# Patient Record
Sex: FEMALE | Race: WHITE | Employment: FULL TIME | URBAN - METROPOLITAN AREA
[De-identification: names, ages, dates, MRNs, and addresses within clinical notes are randomized per-mention and may not be internally consistent; named-entity substitution may affect disease eponyms.]

---

## 2017-04-25 ENCOUNTER — TELEPHONE (OUTPATIENT)
Dept: NEUROLOGY | Facility: CLINIC | Age: 37
End: 2017-04-25

## 2017-04-26 ENCOUNTER — OFFICE VISIT (OUTPATIENT)
Dept: NEUROLOGY | Facility: CLINIC | Age: 37
End: 2017-04-26

## 2017-04-26 ENCOUNTER — HOSPITAL ENCOUNTER (OUTPATIENT)
Dept: GENERAL RADIOLOGY | Facility: HOSPITAL | Age: 37
Discharge: HOME | End: 2017-04-26
Attending: PHYSICAL MEDICINE & REHABILITATION
Payer: COMMERCIAL

## 2017-04-26 VITALS
DIASTOLIC BLOOD PRESSURE: 66 MMHG | HEIGHT: 60 IN | OXYGEN SATURATION: 96 % | WEIGHT: 95 LBS | RESPIRATION RATE: 15 BRPM | SYSTOLIC BLOOD PRESSURE: 112 MMHG | HEART RATE: 89 BPM | BODY MASS INDEX: 18.65 KG/M2

## 2017-04-26 DIAGNOSIS — M95.5 PELVIC OBLIQUITY: ICD-10-CM

## 2017-04-26 DIAGNOSIS — M46.1 SACROILIITIS (HCC): ICD-10-CM

## 2017-04-26 DIAGNOSIS — M51.26 BULGING LUMBAR DISC: ICD-10-CM

## 2017-04-26 DIAGNOSIS — M70.71 ISCHIAL BURSITIS OF RIGHT SIDE: ICD-10-CM

## 2017-04-26 DIAGNOSIS — M70.71 ISCHIAL BURSITIS OF RIGHT SIDE: Primary | ICD-10-CM

## 2017-04-26 PROCEDURE — 99204 OFFICE O/P NEW MOD 45 MIN: CPT | Performed by: PHYSICAL MEDICINE & REHABILITATION

## 2017-04-26 PROCEDURE — 72202 X-RAY EXAM SI JOINTS 3/> VWS: CPT

## 2017-04-26 PROCEDURE — 72120 X-RAY BEND ONLY L-S SPINE: CPT

## 2017-04-26 RX ORDER — MULTIVITAMIN
1 TABLET ORAL DAILY
COMMUNITY

## 2017-04-26 RX ORDER — NORGESTREL AND ETHINYL ESTRADIOL 0.3-0.03MG
KIT ORAL DAILY
Refills: 6 | COMMUNITY
Start: 2017-03-30

## 2017-04-26 RX ORDER — IBUPROFEN 200 MG
200 TABLET ORAL EVERY 6 HOURS PRN
COMMUNITY
End: 2018-11-05 | Stop reason: SINTOL

## 2017-04-26 NOTE — PATIENT INSTRUCTIONS
- schedule physical therapy [phone]  Hugo Mejía  - get xrays for your pelvis and low back  - follow up in 3rd or 4th week of therapy  - ibuprofen 600mg three times a day for one week  - check waist height in mirror    As of October 6th 2014, the Drug [facility] be picking up prescription, office must be given name of individual in advance and they must present an ID as well. · The name of the person picking up your prescription must be documented in your chart.

## 2017-04-26 NOTE — PROGRESS NOTES
History of Present Illness:   Patient presents with:  Pain: new right handed patient here with right bottom of buttocks pain and pinching with tightness in low back/tailbone area which started in 2011 after running.  patient would like to run again and pain 200 mg by mouth every 6 (six) hours as needed for Pain. Disp:  Rfl:    Menthol, Topical Analgesic, (BIOFREEZE) 4 % External Gel Apply topically as needed.  Disp:  Rfl:        Family History   Problem Relation Age of Onset   • Heart Disease Father    • Brice Yany eyes, nares, ears. Lymphatic:  No femoral nodes or masses lisa. Lungs: no acute respiratory distress. Abdomen: soft, nontender  Pelvic alignment: right higher asis and psis in standing  Extremities:  No lower extremity edema lisa.   Without clubbing or cy for sacroiliitis  5. She has L5-S1 bulging disc seen on mri pelvis in past. Discussed with pelvic obliquity, right L5 and S1 can be intermittently pinched depending on how she is running.    6. Recommended she stay at current mileage for now until she start

## 2017-04-28 ENCOUNTER — TELEPHONE (OUTPATIENT)
Dept: NEUROLOGY | Facility: CLINIC | Age: 37
End: 2017-04-28

## 2017-04-29 NOTE — TELEPHONE ENCOUNTER
Results of Sacroiliac Joint and Lumbar Spine Flex/Ext Xrays done 4/26/17  No injection documented as being recommended as of yet-will review with Dr. Delle Aschoff

## 2017-05-07 PROBLEM — M70.71 ISCHIAL BURSITIS OF RIGHT SIDE: Status: ACTIVE | Noted: 2017-05-07

## 2017-05-07 PROBLEM — M95.5 PELVIC OBLIQUITY: Status: ACTIVE | Noted: 2017-05-07

## 2017-05-08 NOTE — TELEPHONE ENCOUNTER
pls call - sacroiliac joint xrays normal. Low back xrays show the bottom disc is thinned out. She has a little more curve in the low back.  See if she can go to [facility] pt. -mk

## 2017-05-08 NOTE — TELEPHONE ENCOUNTER
Patient was given Dr. Isidro Hayes message (as written on 5/07/17) regarding her x-rays, and going to [facility] physical therapy. Patient said she will schedule to therapy; said she has the order.

## 2017-05-24 ENCOUNTER — OFFICE VISIT (OUTPATIENT)
Dept: PHYSICAL THERAPY | Facility: HOSPITAL | Age: 37
End: 2017-05-24
Payer: COMMERCIAL

## 2017-05-24 DIAGNOSIS — M46.1 SACROILIITIS (HCC): ICD-10-CM

## 2017-05-24 DIAGNOSIS — M51.26 BULGING LUMBAR DISC: Primary | ICD-10-CM

## 2017-05-24 DIAGNOSIS — M95.5 PELVIC OBLIQUITY: ICD-10-CM

## 2017-05-24 DIAGNOSIS — M70.71 ISCHIAL BURSITIS OF RIGHT SIDE: ICD-10-CM

## 2017-05-24 PROCEDURE — 97110 THERAPEUTIC EXERCISES: CPT

## 2017-05-24 PROCEDURE — 97161 PT EVAL LOW COMPLEX 20 MIN: CPT

## 2017-05-24 PROCEDURE — 97535 SELF CARE MNGMENT TRAINING: CPT

## 2017-05-24 NOTE — PROGRESS NOTES
PELVIC FLOOR EVALUATION:   Referring Physician: Dr. NEELY [facility]  Diagnosis: Bulging lumbar disc (M51.26)  Sacroiliitis ([address] (M46.1)  Pelvic obliquity (M95.5)  Ischial bursitis of right side (M70.71)  Date of Onset: 02/21/2017     Date of Service: 5/24/2017 mile runs which requires prophylactic ibuprofen prior to the run. Occasional urinary incontinence with faster pace running, intermittent ABDULLAHI with a strong sneeze and cough. Denies any difficulty with bowel movements.  Quadraped position causes deep vaginal pelvic floor strength to at least 4/5 MMT for at least 8 sec endurance x 8 reps for improved running tolerance   3. Pt to report reduced urinary leakage with strong sneeze to none for improved bladder control    4.  Pt to improve FOTO score to less than 5% stable  Frequency of bowel movements: 1 time daily  Stool consistency: Javon Proctor 61 Scale:4, occasional - type 1, type 6   Do you strain with defecation La: No   Laxative use: No    SEXUAL HEALTH STATUS  History of Sexual Abuse: no  Sexual Revere Sta consent given internal examination: yes    External Observation  Mons pubis: WNL  Labia majora: WNL  Labia minora:  WNL  Urethral meatus: WNL  Introitus: WNL  Perineal body: WNL  Pelvic clock: Tender at R pubic ramus, R ischial tuberosity and noted atrophy Please co-sign or sign and return this letter via fax as soon as possible to [phone]. If you have any questions, please contact me at Dept: [phone]    Sincerely,  Electronically signed by:    Therapist: Loreto Shepard PT, DPT, OCS  5/24/2017 11:

## 2017-05-31 ENCOUNTER — OFFICE VISIT (OUTPATIENT)
Dept: PHYSICAL THERAPY | Facility: HOSPITAL | Age: 37
End: 2017-05-31
Attending: PHYSICAL MEDICINE & REHABILITATION
Payer: COMMERCIAL

## 2017-05-31 PROCEDURE — 97140 MANUAL THERAPY 1/> REGIONS: CPT

## 2017-05-31 NOTE — PROGRESS NOTES
05/24/17 Assessment Summary:  Gracie Sanchez is a generally healthy  42year old female with a diagnosis of Bulging lumbar disc (M51.26), Sacroiliitis (HCC) (M46.1), Pelvic obliquity (M95.5), Ischial bursitis of right side (M70.71) who presents with re-education, manual joint mobilization, self care, education, HEP and ultrasound    Reccommended frequency/duration: 1-2x/week X 12 VISITS: by 8/15/17    Patient/Family Goal: \"to be able to run without this nagging pain and to prevent another stress frac myofascial release, and connective tissue skin rolling in centripedal direction with aims to loosen adhesions, reduce pain, increase lymphatic flow, increase circulation and increase waste removal of inflammation.   Tolerated well and followed with prone st

## 2017-06-13 ENCOUNTER — OFFICE VISIT (OUTPATIENT)
Dept: PHYSICAL THERAPY | Facility: HOSPITAL | Age: 37
End: 2017-06-13
Attending: PHYSICAL MEDICINE & REHABILITATION
Payer: COMMERCIAL

## 2017-06-13 PROCEDURE — 97140 MANUAL THERAPY 1/> REGIONS: CPT

## 2017-06-13 NOTE — PROGRESS NOTES
05/24/17 Assessment Summary:  Juan Aden is a generally healthy  42year old female with a diagnosis of Bulging lumbar disc (M51.26), Sacroiliitis (HCC) (M46.1), Pelvic obliquity (M95.5), Ischial bursitis of right side (M70.71) who presents with re-education, manual joint mobilization, self care, education, HEP and ultrasound    Reccommended frequency/duration: 1-2x/week X 12 VISITS: by 8/15/17    Patient/Family Goal: \"to be able to run without this nagging pain and to prevent another stress frac addressing soft tissue hypertonicity and trigger points throughout the gluteals, coccygeus, piriformis, hamstring and adductor insertions with soft tissue mobilization including effleurage and pétrissage, myofascial release, and connective tissue skin roll 5/10.   Plan to continue addressing soft tissue and joint restrictions with mobilizations as well as internal pelvic soft tissue mobilization as needed. Plan to progress strengthening and stretching as able to tolerate.   Therapist: Anum Martinez, PT, DP

## 2017-06-20 ENCOUNTER — APPOINTMENT (OUTPATIENT)
Dept: PHYSICAL THERAPY | Facility: HOSPITAL | Age: 37
End: 2017-06-20
Attending: PHYSICAL MEDICINE & REHABILITATION
Payer: COMMERCIAL

## 2017-06-22 ENCOUNTER — APPOINTMENT (OUTPATIENT)
Dept: PHYSICAL THERAPY | Facility: HOSPITAL | Age: 37
End: 2017-06-22
Attending: PHYSICAL MEDICINE & REHABILITATION
Payer: COMMERCIAL

## 2017-06-27 ENCOUNTER — APPOINTMENT (OUTPATIENT)
Dept: PHYSICAL THERAPY | Facility: HOSPITAL | Age: 37
End: 2017-06-27
Attending: PHYSICAL MEDICINE & REHABILITATION
Payer: COMMERCIAL

## 2017-07-13 ENCOUNTER — OFFICE VISIT (OUTPATIENT)
Dept: PHYSICAL THERAPY | Facility: HOSPITAL | Age: 37
End: 2017-07-13
Attending: PHYSICAL MEDICINE & REHABILITATION
Payer: COMMERCIAL

## 2017-07-13 PROCEDURE — 97140 MANUAL THERAPY 1/> REGIONS: CPT

## 2017-07-13 NOTE — PROGRESS NOTES
05/24/17 Assessment Summary:  Richar Calderon is a generally healthy  42year old female with a diagnosis of Bulging lumbar disc (M51.26), Sacroiliitis (HCC) (M46.1), Pelvic obliquity (M95.5), Ischial bursitis of right side (M70.71) who presents with re-education, manual joint mobilization, self care, education, HEP and ultrasound    Reccommended frequency/duration: 1-2x/week X 12 VISITS: by 8/15/17    Patient/Family Goal: \"to be able to run without this nagging pain and to prevent another stress frac this past weekend while it was in the middle of the day compared to her normal morning runs which she urinate prior to running due to emptying the bladder.  Focused session on addressing soft tissue hypertonicity and trigger points throughout the gluteals, involved obturator internus, puborecatlis and iliococcygeus muscles. Tolerated all well and reports feeling mildly sore in a good way post-tx with no change in dull aching of 5/10.    Plan to continue addressing soft tissue and joint restrictions with mobi

## 2017-07-19 ENCOUNTER — TELEPHONE (OUTPATIENT)
Dept: PHYSICAL THERAPY | Facility: HOSPITAL | Age: 37
End: 2017-07-19

## 2017-07-19 NOTE — TELEPHONE ENCOUNTER
----- Message from Sultana Purcell sent at 7/19/2017 12:33 PM CDT -----  Regarding: Cancelation  She called the scheduling line to cancel her last 2 appointments. She stated she would call back when she returns from out of town to reschedule. Thanks!

## 2018-11-05 ENCOUNTER — OFFICE VISIT (OUTPATIENT)
Dept: INTERNAL MEDICINE CLINIC | Facility: CLINIC | Age: 38
End: 2018-11-05
Payer: COMMERCIAL

## 2018-11-05 VITALS
RESPIRATION RATE: 18 BRPM | HEIGHT: 60 IN | HEART RATE: 81 BPM | WEIGHT: 93.19 LBS | DIASTOLIC BLOOD PRESSURE: 70 MMHG | BODY MASS INDEX: 18.3 KG/M2 | SYSTOLIC BLOOD PRESSURE: 104 MMHG | TEMPERATURE: 97 F

## 2018-11-05 DIAGNOSIS — Z00.00 PHYSICAL EXAM: ICD-10-CM

## 2018-11-05 DIAGNOSIS — K21.9 GERD WITHOUT ESOPHAGITIS: Primary | ICD-10-CM

## 2018-11-05 PROCEDURE — 99203 OFFICE O/P NEW LOW 30 MIN: CPT | Performed by: INTERNAL MEDICINE

## 2018-11-05 PROCEDURE — 99212 OFFICE O/P EST SF 10 MIN: CPT | Performed by: INTERNAL MEDICINE

## 2018-11-05 RX ORDER — OMEPRAZOLE 40 MG/1
40 CAPSULE, DELAYED RELEASE ORAL DAILY
Qty: 90 CAPSULE | Refills: 0 | Status: SHIPPED | OUTPATIENT
Start: 2018-11-05 | End: 2018-11-26

## 2018-11-05 NOTE — PROGRESS NOTES
HPI:    Patient ID: Marcos Lockhart is a 34year old female. Patient presents with:  Abdominal Pain: Pt is presenting today as a new pt and for persistent epigatric abdominal ache that started on 10/12/18 but on off for the past 5 months.     Abdomin 30-60 minutes before breakfast AND AT  BED  TIME FOR  2  WEEKS  THAN    IN AM  ONLY Disp: 90 capsule Rfl: 0   ELINEST 0.3-30 MG-MCG Oral Tab Take by mouth daily.  Disp:  Rfl: 6   Multiple Vitamin (ONE-DAILY MULTI VITAMINS) Oral Tab Take 1 tablet by mouth da She has a normal mood and affect. Her behavior is normal.   Nursing note and vitals reviewed. Blood pressure 104/70, pulse 81, temperature 97.3 °F (36.3 °C), temperature source Oral, resp. rate 18, height 5' (1.524 m), weight 93 lb 3.2 oz (42.3 kg).

## 2018-11-08 ENCOUNTER — LAB ENCOUNTER (OUTPATIENT)
Dept: LAB | Age: 38
End: 2018-11-08
Attending: INTERNAL MEDICINE
Payer: COMMERCIAL

## 2018-11-08 DIAGNOSIS — K21.9 GERD WITHOUT ESOPHAGITIS: ICD-10-CM

## 2018-11-08 DIAGNOSIS — Z00.00 PHYSICAL EXAM: ICD-10-CM

## 2018-11-08 PROCEDURE — 81015 MICROSCOPIC EXAM OF URINE: CPT

## 2018-11-08 PROCEDURE — 36415 COLL VENOUS BLD VENIPUNCTURE: CPT

## 2018-11-08 PROCEDURE — 80061 LIPID PANEL: CPT

## 2018-11-08 PROCEDURE — 85025 COMPLETE CBC W/AUTO DIFF WBC: CPT

## 2018-11-08 PROCEDURE — 80053 COMPREHEN METABOLIC PANEL: CPT

## 2018-11-08 PROCEDURE — 84443 ASSAY THYROID STIM HORMONE: CPT

## 2018-11-12 NOTE — PROGRESS NOTES
Please call patient with blood test results. Kidney and liver function are normal, no anemia. Cholesterol is normal   sugar is normal,  Thyroid hormone is normal as well. Urine is clear.      I recommend patient to follow up within 1 month or sooner

## 2018-11-26 ENCOUNTER — OFFICE VISIT (OUTPATIENT)
Dept: INTERNAL MEDICINE CLINIC | Facility: CLINIC | Age: 38
End: 2018-11-26
Payer: COMMERCIAL

## 2018-11-26 VITALS
DIASTOLIC BLOOD PRESSURE: 68 MMHG | BODY MASS INDEX: 18.3 KG/M2 | HEART RATE: 81 BPM | HEIGHT: 60 IN | RESPIRATION RATE: 18 BRPM | TEMPERATURE: 97 F | WEIGHT: 93.19 LBS | SYSTOLIC BLOOD PRESSURE: 105 MMHG

## 2018-11-26 DIAGNOSIS — R10.13 EPIGASTRIC PAIN: ICD-10-CM

## 2018-11-26 DIAGNOSIS — K21.9 GASTROESOPHAGEAL REFLUX DISEASE WITHOUT ESOPHAGITIS: Primary | ICD-10-CM

## 2018-11-26 PROCEDURE — 99214 OFFICE O/P EST MOD 30 MIN: CPT | Performed by: INTERNAL MEDICINE

## 2018-11-26 PROCEDURE — 99212 OFFICE O/P EST SF 10 MIN: CPT | Performed by: INTERNAL MEDICINE

## 2018-11-26 RX ORDER — OMEPRAZOLE 40 MG/1
40 CAPSULE, DELAYED RELEASE ORAL DAILY
Qty: 180 CAPSULE | Refills: 0 | Status: SHIPPED | OUTPATIENT
Start: 2018-11-26 | End: 2018-12-26

## 2018-11-26 NOTE — PROGRESS NOTES
HPI:    Patient ID: Carey Arango is a 34year old female.   Patient presents with:  Abdominal Pain: Pt state that she is f/u from last visit with abdominal issues and that she is still having abdominal issues  Patient states she had someone to Than breath and wheezing. Cardiovascular: Negative for chest pain, palpitations and leg swelling. Gastrointestinal: Positive for heartburn.  Negative for abdominal distention, abdominal pain, anal bleeding, blood in stool, constipation, diarrhea, nausea, re murmur heard. Pulmonary/Chest: Effort normal and breath sounds normal. No respiratory distress. She has no wheezes. She has no rales. Abdominal: Soft. Bowel sounds are normal. She exhibits no mass. There is no hepatosplenomegaly.  There is tenderness (mi 0     Sig: Take 1 capsule (40 mg total) by mouth daily.  Take 30-60 minutes before breakfast AND AT  BED  TIME FOR  2  WEEKS  THAN    IN AM  ONLY       Imaging & Referrals:  US ABDOMEN COMPLETE (CPT=[id number])       BS#[id number]

## 2018-11-30 ENCOUNTER — APPOINTMENT (OUTPATIENT)
Dept: LAB | Age: 38
End: 2018-11-30
Attending: INTERNAL MEDICINE
Payer: COMMERCIAL

## 2018-11-30 DIAGNOSIS — R10.13 EPIGASTRIC PAIN: ICD-10-CM

## 2018-11-30 DIAGNOSIS — K21.9 GASTROESOPHAGEAL REFLUX DISEASE WITHOUT ESOPHAGITIS: ICD-10-CM

## 2018-11-30 PROCEDURE — 87338 HPYLORI STOOL AG IA: CPT

## 2018-12-04 ENCOUNTER — PATIENT MESSAGE (OUTPATIENT)
Dept: INTERNAL MEDICINE CLINIC | Facility: CLINIC | Age: 38
End: 2018-12-04

## 2018-12-04 NOTE — TELEPHONE ENCOUNTER
From: Kalani Knight  To: Stephon Botello MD  Sent: 12/4/2018 10:00 AM CST  Subject: Test Results Question    Good morning, how long does it typically take for the H Pylori test results?

## 2018-12-04 NOTE — TELEPHONE ENCOUNTER
Message below sent to patient RE: H Pylori--please review and respond once results are final.        Toña Brown,    I just spoke with [facility]--it does usually take 72 hours for H Pylori results, but the weekend delayed the results.  [facility] st

## 2018-12-07 ENCOUNTER — PATIENT MESSAGE (OUTPATIENT)
Dept: INTERNAL MEDICINE CLINIC | Facility: CLINIC | Age: 38
End: 2018-12-07

## 2018-12-07 NOTE — TELEPHONE ENCOUNTER
From: Milagros Knight  To: Bonnie Vera MD  Sent: 12/7/2018 10:50 AM CST  Subject: Test Results Question    Hello, I am still waiting on the results of my H Pylori test.    Thank you,    Azul Medina

## 2019-06-24 ENCOUNTER — HOSPITAL ENCOUNTER (OUTPATIENT)
Dept: BONE DENSITY | Age: 39
Discharge: HOME | End: 2019-06-24
Attending: OBSTETRICS & GYNECOLOGY
Payer: COMMERCIAL

## 2019-06-24 DIAGNOSIS — N87.1 CERVICAL DYSPLASIA, MODERATE: ICD-10-CM

## 2019-06-24 PROCEDURE — 77080 DXA BONE DENSITY AXIAL: CPT | Performed by: OBSTETRICS & GYNECOLOGY

## 2019-08-17 ENCOUNTER — APPOINTMENT (OUTPATIENT)
Dept: CT IMAGING | Facility: HOSPITAL | Age: 39
End: 2019-08-17
Attending: NURSE PRACTITIONER
Payer: COMMERCIAL

## 2019-08-17 ENCOUNTER — APPOINTMENT (OUTPATIENT)
Dept: GENERAL RADIOLOGY | Facility: HOSPITAL | Age: 39
End: 2019-08-17
Attending: NURSE PRACTITIONER
Payer: COMMERCIAL

## 2019-08-17 ENCOUNTER — HOSPITAL ENCOUNTER (EMERGENCY)
Facility: HOSPITAL | Age: 39
Discharge: HOME | End: 2019-08-17
Payer: COMMERCIAL

## 2019-08-17 VITALS
BODY MASS INDEX: 17.47 KG/M2 | OXYGEN SATURATION: 100 % | WEIGHT: 89 LBS | TEMPERATURE: 98 F | HEART RATE: 59 BPM | SYSTOLIC BLOOD PRESSURE: 109 MMHG | HEIGHT: 60 IN | RESPIRATION RATE: 16 BRPM | DIASTOLIC BLOOD PRESSURE: 70 MMHG

## 2019-08-17 DIAGNOSIS — R55 SYNCOPE AND COLLAPSE: Primary | ICD-10-CM

## 2019-08-17 LAB
ANION GAP SERPL CALC-SCNC: 7 MMOL/L (ref 0–18)
B-HCG UR QL: NEGATIVE
BASOPHILS # BLD AUTO: 0.04 X10(3) UL (ref 0–0.2)
BASOPHILS NFR BLD AUTO: 0.5 %
BILIRUB UR QL: NEGATIVE
BUN BLD-MCNC: 19 MG/DL (ref 7–18)
BUN/CREAT SERPL: 22.6 (ref 10–20)
CALCIUM BLD-MCNC: 9.3 MG/DL (ref 8.5–10.1)
CHLORIDE SERPL-SCNC: 103 MMOL/L (ref 98–112)
CLARITY UR: CLEAR
CO2 SERPL-SCNC: 30 MMOL/L (ref 21–32)
COLOR UR: COLORLESS
CREAT BLD-MCNC: 0.84 MG/DL (ref 0.55–1.02)
D DIMER PPP FEU-MCNC: <0.27 UG/ML FEU (ref ?–0.5)
DEPRECATED RDW RBC AUTO: 43.5 FL (ref 35.1–46.3)
EOSINOPHIL # BLD AUTO: 0.08 X10(3) UL (ref 0–0.7)
EOSINOPHIL NFR BLD AUTO: 0.9 %
ERYTHROCYTE [DISTWIDTH] IN BLOOD BY AUTOMATED COUNT: 12.7 % (ref 11–15)
GLUCOSE BLD-MCNC: 101 MG/DL (ref 70–99)
GLUCOSE UR-MCNC: NEGATIVE MG/DL
HCT VFR BLD AUTO: 39.8 % (ref 35–48)
HGB BLD-MCNC: 13.3 G/DL (ref 12–16)
HGB UR QL STRIP.AUTO: NEGATIVE
IMM GRANULOCYTES # BLD AUTO: 0.02 X10(3) UL (ref 0–1)
IMM GRANULOCYTES NFR BLD: 0.2 %
KETONES UR-MCNC: NEGATIVE MG/DL
LEUKOCYTE ESTERASE UR QL STRIP.AUTO: NEGATIVE
LYMPHOCYTES # BLD AUTO: 2.89 X10(3) UL (ref 1–4)
LYMPHOCYTES NFR BLD AUTO: 32.8 %
MCH RBC QN AUTO: 31.4 PG (ref 26–34)
MCHC RBC AUTO-ENTMCNC: 33.4 G/DL (ref 31–37)
MCV RBC AUTO: 93.9 FL (ref 80–100)
MONOCYTES # BLD AUTO: 0.66 X10(3) UL (ref 0.1–1)
MONOCYTES NFR BLD AUTO: 7.5 %
NEUTROPHILS # BLD AUTO: 5.13 X10 (3) UL (ref 1.5–7.7)
NEUTROPHILS # BLD AUTO: 5.13 X10(3) UL (ref 1.5–7.7)
NEUTROPHILS NFR BLD AUTO: 58.1 %
NITRITE UR QL STRIP.AUTO: NEGATIVE
OSMOLALITY SERPL CALC.SUM OF ELEC: 292 MOSM/KG (ref 275–295)
PH UR: 6 [PH] (ref 5–8)
PLATELET # BLD AUTO: 266 10(3)UL (ref 150–450)
POTASSIUM SERPL-SCNC: 3.4 MMOL/L (ref 3.5–5.1)
PROT UR-MCNC: NEGATIVE MG/DL
RBC # BLD AUTO: 4.24 X10(6)UL (ref 3.8–5.3)
SODIUM SERPL-SCNC: 140 MMOL/L (ref 136–145)
SP GR UR STRIP: 1 (ref 1–1.03)
TROPONIN I SERPL-MCNC: <0.045 NG/ML (ref ?–0.04)
UROBILINOGEN UR STRIP-ACNC: <2
VIT C UR-MCNC: NEGATIVE MG/DL
WBC # BLD AUTO: 8.8 X10(3) UL (ref 4–11)

## 2019-08-17 PROCEDURE — 81025 URINE PREGNANCY TEST: CPT

## 2019-08-17 PROCEDURE — 81001 URINALYSIS AUTO W/SCOPE: CPT | Performed by: NURSE PRACTITIONER

## 2019-08-17 PROCEDURE — 73080 X-RAY EXAM OF ELBOW: CPT | Performed by: NURSE PRACTITIONER

## 2019-08-17 PROCEDURE — 85025 COMPLETE CBC W/AUTO DIFF WBC: CPT | Performed by: NURSE PRACTITIONER

## 2019-08-17 PROCEDURE — 93010 ELECTROCARDIOGRAM REPORT: CPT | Performed by: NURSE PRACTITIONER

## 2019-08-17 PROCEDURE — 90471 IMMUNIZATION ADMIN: CPT

## 2019-08-17 PROCEDURE — 80048 BASIC METABOLIC PNL TOTAL CA: CPT | Performed by: NURSE PRACTITIONER

## 2019-08-17 PROCEDURE — 84484 ASSAY OF TROPONIN QUANT: CPT | Performed by: NURSE PRACTITIONER

## 2019-08-17 PROCEDURE — 99284 EMERGENCY DEPT VISIT MOD MDM: CPT

## 2019-08-17 PROCEDURE — 71046 X-RAY EXAM CHEST 2 VIEWS: CPT | Performed by: NURSE PRACTITIONER

## 2019-08-17 PROCEDURE — 85379 FIBRIN DEGRADATION QUANT: CPT | Performed by: NURSE PRACTITIONER

## 2019-08-17 PROCEDURE — 96374 THER/PROPH/DIAG INJ IV PUSH: CPT

## 2019-08-17 PROCEDURE — 96361 HYDRATE IV INFUSION ADD-ON: CPT

## 2019-08-17 PROCEDURE — 70450 CT HEAD/BRAIN W/O DYE: CPT | Performed by: NURSE PRACTITIONER

## 2019-08-17 PROCEDURE — 93005 ELECTROCARDIOGRAM TRACING: CPT

## 2019-08-17 RX ORDER — KETOROLAC TROMETHAMINE 15 MG/ML
15 INJECTION, SOLUTION INTRAMUSCULAR; INTRAVENOUS ONCE
Status: COMPLETED | OUTPATIENT
Start: 2019-08-17 | End: 2019-08-17

## 2019-08-17 RX ORDER — SPIRONOLACTONE 50 MG/1
50 TABLET, FILM COATED ORAL DAILY
COMMUNITY
End: 2019-09-11

## 2019-08-17 NOTE — ED INITIAL ASSESSMENT (HPI)
Kelley Sheldon had 4 glasses of wine last night and while up in the middle of the night, hit her head and fell. +LOC. Pain to right frontal area of her head and posterior head pain.

## 2019-08-17 NOTE — ED PROVIDER NOTES
Patient Seen in: [facility] AND [facility] Emergency Department    History   CC: fall  HPI: Sierra Knight 37year old female  who presents to the ER c/o headache, intermittent dizziness as well as left elbow pain with abrasion status post syncopal episod :   spironolactone 50 MG Oral Tab,  Take 50 mg by mouth daily. ELINEST 0.3-30 MG-MCG Oral Tab,  Take by mouth daily. Multiple Vitamin (ONE-DAILY MULTI VITAMINS) Oral Tab,  Take 1 tablet by mouth daily.    Calcium Carbonate-Vitamin D (CALCIUM + D OR),  T swelling/trauma/deformity, cap refill <2seconds  Neuro - A&O x4, steady gait  MSK - makes purposeful movements of all extremities, radial pulses 2+ bilat.   Psych - Interactive and appropriate      ED Course     Labs Reviewed   BASIC METABOLIC PANEL (8) - A visit in 2 days        Medications Prescribed:  Current Discharge Medication List

## 2019-08-17 NOTE — ED NOTES
Patient reports falling yesterday with possible trauma to head and neck, as well as left elbow pain. Small abrasion to left elbow. Patient aware of plan of care.

## 2019-08-21 ENCOUNTER — TELEPHONE (OUTPATIENT)
Dept: INTERNAL MEDICINE CLINIC | Facility: CLINIC | Age: 39
End: 2019-08-21

## 2019-08-22 NOTE — TELEPHONE ENCOUNTER
In my response to pt's mychart visit I advised she make appt to see Dr. Chepe Hurst per ER recommendations.  Please review chart in morning to see if she set up an appt

## 2019-08-22 NOTE — TELEPHONE ENCOUNTER
Spoke with patient appointment offered patient refused to be seen she stated she will see her chiropractor today and see how she feels in a couple more days. Patient advised to call back or go straight to ER if s/sx worsen, no futher questions or concerns.

## 2019-08-22 NOTE — TELEPHONE ENCOUNTER
----- Message from [address] [facility] [address] Knight sent at 8/21/2019  3:58 PM CDT -----  Regarding: Visit Follow-up Question  Contact: [phone]  Hello, I was in the ER over the weekend. I have a concussion from a fall early Saturday morning.  My headache and fatigu

## 2019-08-26 NOTE — TELEPHONE ENCOUNTER
Pt states was seen in ER for head concussion 8/17, she started seeing a Chiroparactor and was seen last Wed and has f/u tomorrow for ongoing HA, tingling/pain on rt side of face and tension pain around her neck.  States her condition has not worsen and did

## 2019-08-27 ENCOUNTER — OFFICE VISIT (OUTPATIENT)
Dept: INTERNAL MEDICINE CLINIC | Facility: CLINIC | Age: 39
End: 2019-08-27
Payer: COMMERCIAL

## 2019-08-27 VITALS
HEART RATE: 74 BPM | TEMPERATURE: 98 F | WEIGHT: 91 LBS | DIASTOLIC BLOOD PRESSURE: 65 MMHG | BODY MASS INDEX: 17.87 KG/M2 | HEIGHT: 60 IN | SYSTOLIC BLOOD PRESSURE: 101 MMHG

## 2019-08-27 DIAGNOSIS — R20.0 RIGHT FACIAL NUMBNESS: ICD-10-CM

## 2019-08-27 DIAGNOSIS — R55 SYNCOPE, UNSPECIFIED SYNCOPE TYPE: Primary | ICD-10-CM

## 2019-08-27 DIAGNOSIS — G44.309 POST-CONCUSSION HEADACHE: ICD-10-CM

## 2019-08-27 PROCEDURE — 99214 OFFICE O/P EST MOD 30 MIN: CPT | Performed by: INTERNAL MEDICINE

## 2019-08-27 NOTE — PATIENT INSTRUCTIONS
Concussion    A concussion is a type of brain injury. It can be caused by a direct hit or blow to the head, neck, face, or body.  The force of the blow makes the head and brain shake quickly back and forth. In some cases you may lose consciousness. Depend · You may use acetaminophen to control pain, unless another pain medicine was prescribed.  Don't use aspirin or ibuprofen after a head injury. If you have long-lasting (chronic) liver or kidney disease, talk with your healthcare provider before using these · Fluid draining from or bleeding from the nose or ears  Date Last Reviewed: 6/1/2018  © 0778-9217 [employer] [address], [address] [place] [address]. All rights reserved.  This information is not intended as a substitute for professional Since the effects of a concussion go away over time, there isn’t a lot you need to do. Be assured that this problem is temporary. You’ll likely have a full recovery.  In the meantime, talk with your healthcare provider about ways to relieve any symptoms jonathan The effects of a concussion often go away in 7 to 10 days and the vast majority of people who have had a concussion have recovered after 3 months. If you’re not feeling better as time passes, there may be something else going on.  If your symptoms don’t go

## 2019-08-27 NOTE — PROGRESS NOTES
Patient ID: Zainab Minor is a 37year old female.   Patient presents with:  ER F/U: [facility] 8/17/19 due to Syncope and collapse   Headache: Still experiencing HA and numbness on right side of face       HISTORY OF PRESENT ILLNESS:   HPI  Patient prese Calcium Carbonate-Vitamin D (CALCIUM + D OR), Take by mouth daily. , Disp: , Rfl:   •  Menthol, Topical Analgesic, (BIOFREEZE) 4 % External Gel, Apply topically as needed. , Disp: , Rfl:   •  spironolactone 50 MG Oral Tab, Take 50 mg by mouth daily. , Disp: , Asked        Stress Concern: Not Asked        Weight Concern: Not Asked        Special Diet: Not Asked        Back Care: Not Asked        Exercise:  Yes          run        Bike Helmet: Not Asked        Seat Belt: Not Asked        Self-Exams: Not Asked    S

## 2019-08-28 ENCOUNTER — PATIENT MESSAGE (OUTPATIENT)
Dept: INTERNAL MEDICINE CLINIC | Facility: CLINIC | Age: 39
End: 2019-08-28

## 2019-08-28 NOTE — TELEPHONE ENCOUNTER
From: Anne Knight  To: Tatiana Miller MD  Sent: 8/28/2019 11:09 AM CDT  Subject: Visit Follow-up Question    Dr. ENRRIQUE CR [facility],    I was able to schedule time with a neurologist on 9/11/19. Dr George Villa in [place].      I’m realizing after seeing t

## 2019-08-29 ENCOUNTER — TELEPHONE (OUTPATIENT)
Dept: INTERNAL MEDICINE CLINIC | Facility: CLINIC | Age: 39
End: 2019-08-29

## 2019-08-29 DIAGNOSIS — G44.309 POST-CONCUSSION HEADACHE: Primary | ICD-10-CM

## 2019-08-29 RX ORDER — CYCLOBENZAPRINE HCL 5 MG
5 TABLET ORAL 3 TIMES DAILY PRN
Qty: 30 TABLET | Refills: 0 | Status: SHIPPED | OUTPATIENT
Start: 2019-08-29 | End: 2021-04-26

## 2019-08-29 NOTE — TELEPHONE ENCOUNTER
From: Yolanda Knight  To: Brandie Vargas MD  Sent: 8/28/2019 11:09 AM CDT  Subject: Visit Follow-up Question    Dr. Apirl Mayfield,    I was able to schedule time with a neurologist on 9/11/19. Dr Jey Reyes in [address] [place] [address].      I’m realizing after seeing t

## 2019-08-29 NOTE — TELEPHONE ENCOUNTER
Patient calling to follow up, had OV 8/27/19, requesting if able to be prescribed a muscle relaxer, neck muscle feels tight, believes is contributed to her headaches, please see msg below and advise.

## 2019-09-03 NOTE — PROGRESS NOTES
Muscle relaxant-from Dr. Annemarie Mcqueen was sent to the pharmacy please advised patient to follow-up in few weeks to evaluate on neck pain      Please schedule appointment for patient  Also advised patient  Not get pregnant while taking medication

## 2019-09-04 NOTE — TELEPHONE ENCOUNTER
Left detailed message per note below. Advised to call after 8 am to schedule a follow up appointment.

## 2019-09-05 NOTE — TELEPHONE ENCOUNTER
Pt stated she will f/u with Dr Elana Valentin after appt with Neurology next week - concussion recovery

## 2019-09-11 NOTE — H&P
[facility] Patient / Consult Visit    Griselda Hau is a 37year old female.                          Referring MD: Gustavo Hoffmann    Patient presents with:  Post-Concussion Syndrome: Lisa Colvin on 08/16/2019 and c/o of headache any recent weight change, fevers, chills, nausea, double vision/ blurry vision / loss of vision, chest pain, palpitations, shortness of breath, rashes, joint pains, bowel / bladder incontinence or mood issues. History reviewed.  No pertinent past medica normal, RRR  LUNGS: clear to auscultation bilaterally  EXTREMITIES: no cyanosis, peripheral pulses intact    Neck: Supple; full range of motion; no carotid bruits    Mental status:  Alert and oriented to time, place, person, and situation  Speech: fluent CEREBRUM:     No edema, hemorrhage, mass, acute infarction, or significant atrophy. WHITE MATTER:           Normal white matter. CEREBELLUM: No edema, hemorrhage, mass, acute infarction, or significant atrophy.     BRAINSTEM:    No edema, hemorrhage [place]  Pager [phone]  9/11/2019

## 2019-09-17 ENCOUNTER — HOSPITAL ENCOUNTER (OUTPATIENT)
Dept: ELECTROPHYSIOLOGY | Facility: HOSPITAL | Age: 39
Discharge: HOME | End: 2019-09-17
Attending: Other
Payer: COMMERCIAL

## 2019-09-17 DIAGNOSIS — F07.81 POST CONCUSSION SYNDROME: ICD-10-CM

## 2019-09-17 DIAGNOSIS — R68.89 SPELLS OF DECREASED ATTENTIVENESS: ICD-10-CM

## 2019-09-17 PROCEDURE — 95816 EEG AWAKE AND DROWSY: CPT | Performed by: OTHER

## 2019-09-20 ENCOUNTER — TELEPHONE (OUTPATIENT)
Dept: NEUROLOGY | Facility: CLINIC | Age: 39
End: 2019-09-20

## 2019-10-08 ENCOUNTER — TELEPHONE (OUTPATIENT)
Dept: NEUROLOGY | Facility: CLINIC | Age: 39
End: 2019-10-08

## 2019-10-08 NOTE — TELEPHONE ENCOUNTER
----- Message from [facility], MD sent at 10/8/2019  2:22 PM CDT -----  Results noted, EEG normal; let patient know

## 2019-10-08 NOTE — TELEPHONE ENCOUNTER
LM for pt notifying her the EEG was normal.  Notified pt to call the office if she had any further questions.

## 2019-10-09 ENCOUNTER — APPOINTMENT (OUTPATIENT)
Dept: LAB | Age: 39
End: 2019-10-09
Attending: INTERNAL MEDICINE
Payer: COMMERCIAL

## 2019-10-09 ENCOUNTER — OFFICE VISIT (OUTPATIENT)
Dept: ENDOCRINOLOGY CLINIC | Facility: CLINIC | Age: 39
End: 2019-10-09
Payer: COMMERCIAL

## 2019-10-09 VITALS
BODY MASS INDEX: 18 KG/M2 | DIASTOLIC BLOOD PRESSURE: 75 MMHG | HEART RATE: 86 BPM | SYSTOLIC BLOOD PRESSURE: 112 MMHG | WEIGHT: 91.19 LBS

## 2019-10-09 DIAGNOSIS — Z13.29 THYROID DISORDER SCREENING: ICD-10-CM

## 2019-10-09 DIAGNOSIS — M85.80 OSTEOPENIA, UNSPECIFIED LOCATION: ICD-10-CM

## 2019-10-09 DIAGNOSIS — E55.9 VITAMIN D DEFICIENCY: ICD-10-CM

## 2019-10-09 DIAGNOSIS — E55.9 VITAMIN D DEFICIENCY: Primary | ICD-10-CM

## 2019-10-09 PROCEDURE — 82570 ASSAY OF URINE CREATININE: CPT

## 2019-10-09 PROCEDURE — 84443 ASSAY THYROID STIM HORMONE: CPT

## 2019-10-09 PROCEDURE — 99243 OFF/OP CNSLTJ NEW/EST LOW 30: CPT | Performed by: INTERNAL MEDICINE

## 2019-10-09 PROCEDURE — 82310 ASSAY OF CALCIUM: CPT

## 2019-10-09 PROCEDURE — 36415 COLL VENOUS BLD VENIPUNCTURE: CPT

## 2019-10-09 PROCEDURE — 84080 ASSAY ALKALINE PHOSPHATASES: CPT

## 2019-10-09 PROCEDURE — 82306 VITAMIN D 25 HYDROXY: CPT

## 2019-10-09 PROCEDURE — 83970 ASSAY OF PARATHORMONE: CPT

## 2019-10-09 NOTE — H&P
New Patient Evaluation - History and Physical    CONSULT - Reason for Visit: Osteoporosis/ penia  Requesting Physician: Dr. Liberty Rivera:  Patient presents with:  Consult: osteopenia, Pt has family history of thyroid issues, reports nat file      Number of children: Not on file      Years of education: Not on file      Highest education level: Not on file    Tobacco Use      Smoking status: Never Smoker      Smokeless tobacco: Never Used    Substance and Sexual Activity      Alcohol use: normal  ENT:  Normocephalic, atraumatic  NECK:  Supple, symmetrical, trachea midline, no adenopathy, thyroid symmetric, not enlarged and no tenderness  LUNGS: clear to auscultation bilaterally, no crackles or wheezing  CARDIOVASCULAR:  regular rate and rhy found, treatment of this underlying cause is the focus of management to improve bone health.  Pharmacologic intervention with a bone-active agent can be considered, on a case-by-case basis, for women with fragility fractures, documented persistent bone loss low BMD (or low-trauma fractures) and a known secondary cause of osteoporosis, management should address the underlying cause whenever possible.      PLAN:     - Labs as below   - Discussed dietary intake of calcium  - Resistance exercises  - Fall precauti

## 2019-10-17 ENCOUNTER — TELEPHONE (OUTPATIENT)
Dept: ENDOCRINOLOGY CLINIC | Facility: CLINIC | Age: 39
End: 2019-10-17

## 2019-10-17 PROBLEM — M81.0 OSTEOPOROSIS: Status: ACTIVE | Noted: 2019-10-17

## 2019-10-17 NOTE — TELEPHONE ENCOUNTER
Patient has osteoporosis at an early age. She has h/o pubic symphysis fracture. I had met her and we had discussed that we can decided based on bone markers whether we will proceed with Rx or not.    Her bone marker is good at 7 ( between 6-9 is usually i

## 2019-10-18 NOTE — TELEPHONE ENCOUNTER
Teresaneal Riddles understanding of entire lab results message and has no questions. She will call if she has another fracture.

## 2019-12-19 ENCOUNTER — IMMUNIZATION (OUTPATIENT)
Dept: PEDIATRICS CLINIC | Facility: CLINIC | Age: 39
End: 2019-12-19
Payer: COMMERCIAL

## 2019-12-19 DIAGNOSIS — Z23 NEED FOR VACCINATION: ICD-10-CM

## 2019-12-19 PROCEDURE — 90686 IIV4 VACC NO PRSV 0.5 ML IM: CPT | Performed by: PEDIATRICS

## 2019-12-19 PROCEDURE — 90471 IMMUNIZATION ADMIN: CPT | Performed by: PEDIATRICS

## 2020-01-17 ENCOUNTER — LAB ENCOUNTER (OUTPATIENT)
Dept: LAB | Facility: HOSPITAL | Age: 40
End: 2020-01-17
Attending: INTERNAL MEDICINE
Payer: COMMERCIAL

## 2020-01-17 DIAGNOSIS — K59.01 SLOW TRANSIT CONSTIPATION: Primary | ICD-10-CM

## 2020-01-17 LAB
IGA SERPL-MCNC: 167 MG/DL (ref 70–312)
TSI SER-ACNC: 0.84 MIU/ML (ref 0.36–3.74)

## 2020-01-17 PROCEDURE — 83516 IMMUNOASSAY NONANTIBODY: CPT

## 2020-01-17 PROCEDURE — 82784 ASSAY IGA/IGD/IGG/IGM EACH: CPT

## 2020-01-17 PROCEDURE — 36415 COLL VENOUS BLD VENIPUNCTURE: CPT

## 2020-01-17 PROCEDURE — 84443 ASSAY THYROID STIM HORMONE: CPT

## 2020-01-20 LAB — TTG IGA SER-ACNC: 0.7 U/ML (ref ?–7)

## 2021-03-09 ENCOUNTER — PATIENT MESSAGE (OUTPATIENT)
Dept: INTERNAL MEDICINE CLINIC | Facility: CLINIC | Age: 41
End: 2021-03-09

## 2021-03-09 NOTE — TELEPHONE ENCOUNTER
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 Where can I sign up for the vaccine?

## 2021-04-26 NOTE — PROGRESS NOTES
HPI/Subjective:   Patient ID: Shayla Downs is a 35year old female.   Patient presents with:  Depression  Sleep Problem    Patient in office today for depression and anxiety  In process of divorce  loosing some weight and  Has some sleeping problem ELINEST 0.3-30 MG-MCG Oral Tab Take by mouth daily. 6   • Calcium Carbonate-Vitamin D (CALCIUM + D OR) Take by mouth daily. • Multiple Vitamin (ONE-DAILY MULTI VITAMINS) Oral Tab Take 1 tablet by mouth daily.  (Patient not taking: Reported on 4/26/2021 Mood is anxious and depressed. Behavior: Behavior normal.         Thought Content: Thought content does not include homicidal or suicidal ideation. Thought content does not include homicidal or suicidal plan.       Comments: Sleeping loss 6 - 7  hr

## 2023-10-16 NOTE — PROCEDURES
Called and left message informing patient NIPT results were low risk.     Pt to call clinic if she would like gender placed in envelope for surprise.    EEG report    REFERRING PHYSICIAN: Sherri Menjivar MD    PCP and phone number:  Anitha Yoo MD  [phone]    TECHNIQUE: 21 channels of EEG, 2 channels of EOG, and 1 channel of EKG were recorded utilizing the International 10/20 System.  The re

## (undated) NOTE — MR AVS SNAPSHOT
[facility]  [address], [address] [facility]  [address] [facility] [address] [phone]               Thank you for choosing us for your health care visit with Jayne Roberson MD.  We are glad to serve you and happy to provide you with this summary of your Order:  Physical Therapy - Internal        Comment:  2x week for 4 weeks    Pelvic floor - external and internal Millie Helms for running analysis    Manual releases to obturator internus as tolerated on right    Pelvic alignment    Sacroiliac mo Pelvic obliquity [M95.5], Ischial bursitis of right side [M70.71]          Reason for Today's Visit     Pain           Medical Issues Discussed Today     Ischial bursitis of right side    -  Primary    Pelvic obliquity        Sacroiliitis (HCC)        Bulg  protocol for controlled substances: Written prescriptions  · Written prescriptions must be picked up in office.   · Please allow the office 48-72 hours to fill the prescription as our physicians rotate between multiple offices and procedure days in If you have questions, you can call [phone] to talk to our [facility] Staff. Remember, Vo[employer] is NOT to be used for urgent needs. For medical emergencies, dial 911.            Visit [facility] online at  [employer]tn

## (undated) NOTE — MR AVS SNAPSHOT
[address]  [phone]               Thank you for choosing us for your health care visit with Brigitte Parker PT.   We are glad to serve you and happy to provide you wit [email or web address]     Sign up for [employer], your secure online medical record. [employer] will allow you to access patient instructions from your recent visit,  view other health information, and more. To sign up or find more information, go to [email or web address]

## (undated) NOTE — LETTER
05/26/21        Marilu Knight  [address]      Dear Guera Tidwell,    [address] records indicate that you have outstanding lab work and or testing that was ordered for you and has not yet been completed:  Orders Placed This Encounter

## (undated) NOTE — LETTER
08/26/21        Priti Knight  [address]      Dear Nicole Dominguez,    [address] [facility] records indicate that you have outstanding lab work and or testing that was ordered for you and has not yet been completed:  Orders Placed This Encounter

## (undated) NOTE — LETTER
12/28/21        Kiera Knight  [address]      Dear Li Bagley,    [facility] records indicate that you have outstanding lab work and or testing that was ordered for you and has not yet been completed:  Orders Placed This Encounter

## (undated) NOTE — MR AVS SNAPSHOT
Srinivas Izquierdo [address]  [phone]  [phone]               Thank you for choosing us for your health care visit with Rachael Farfan PT.   We are glad to serve you and happy to provide you wit [facility]     Sign up for [employer], your secure online medical record. [employer] will allow you to access patient instructions from your recent visit,  view other health information, and more. To sign up or find more information, go to [email or web address]

## (undated) NOTE — LETTER
November 12, 2018     Ariana Arango Sidaway  [address]      Dear Dale Ring:    Below are the results from your recent visit:    Your kidney and liver function are normal, it show no anemia.    Your cholesterol is normal, your sugar  140 - 400 K/UL    MPV 8.6 7.4 - 10.3 fL    Neutrophil % 53 %    Lymphocyte % 37 %    Monocyte % 7 %    Eosinophil % 2 %    Basophil % 1 %    Neutrophil Absolute 3.1 1.8 - 7.7 K/UL    Lymphocyte Absolute 2.2 1.0 - 4.0 K/UL    Monocyte Absolute 0.4

## (undated) NOTE — LETTER
10/28/21        Maribell Knight  [address]      Dear Althea Norman,    [facility] records indicate that you have outstanding lab work and or testing that was ordered for you and has not yet been completed:  Orders Placed This Encounter

## (undated) NOTE — MR AVS SNAPSHOT
Srinivas Izquierdo [address]  [phone]  [phone]               Thank you for choosing us for your health care visit with Glory Dong PT.   We are glad to serve you and happy to provide you wit Sign up for [employer], your secure online medical record. [employer] will allow you to access patient instructions from your recent visit,  view other health information, and more. To sign up or find more information, go to [email or web address]

## (undated) NOTE — ED AVS SNAPSHOT
Marie Irvin   MRN: [id number]    Department:  [facility] Emergency Department   Date of Visit:  8/17/2019           Disclosure     Insurance plans vary and the physician(s) referred by the ER may not be covered by your plan.  Please co CARE PHYSICIAN AT ONCE OR RETURN IMMEDIATELY TO THE EMERGENCY DEPARTMENT. If you have been prescribed any medication(s), please fill your prescription right away and begin taking the medication(s) as directed.   If you believe that any of the medications

## (undated) NOTE — LETTER
09/14/19    Dear Dr. Elisa Green      Thank you for referring your patient, Ilene Knight to me for an evaluation. Please see my initial consult note enclosed below. Let me know if you have any questions.     Thank you  Tono Riggs MD, Rupali Morrissey gradually started working again full time after ~ 2 weeks - states during the second week, if she was meeting with a client, she would feel very \"tired. \"  She tried driving the day after her injury and was very nauseated     She is back to driving and de A comprehensive 10 point review of systems was completed. Pertinent positives and negatives noted in the the HPI.       PHYSICAL EXAM:   /60 (BP Location: Left arm, Patient Position: Sitting, Cuff Size: adult)   Pulse 74   Resp 16   Ht 60\"   Wt 91 l Strength: 5/5 throughout  Tone: normal    Sensory:  Pin is normal  Vibration is normal  Proprioception is normal  Romberg is absent    Coordination:  Finger to nose normal bilaterally  Rapid alternating movements normal bilaterally  Heel to shin is normal losing awareness and hitting her head and will plan for EEG to evaluate.     Otherwise, regarding her concussion, she was advised that in general recovery occurs within ~ 3 months but that this is an individual process; she was encouraged to gradually retur